# Patient Record
Sex: MALE | Race: WHITE
[De-identification: names, ages, dates, MRNs, and addresses within clinical notes are randomized per-mention and may not be internally consistent; named-entity substitution may affect disease eponyms.]

---

## 2018-06-10 ENCOUNTER — HOSPITAL ENCOUNTER (EMERGENCY)
Dept: HOSPITAL 80 - CED | Age: 63
Discharge: HOME | End: 2018-06-10
Payer: MEDICAID

## 2018-06-10 VITALS — DIASTOLIC BLOOD PRESSURE: 92 MMHG | SYSTOLIC BLOOD PRESSURE: 152 MMHG
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
## 2018-06-10 DIAGNOSIS — I25.2: ICD-10-CM

## 2018-06-10 DIAGNOSIS — Z79.82: ICD-10-CM

## 2018-06-10 DIAGNOSIS — T18.9XXA: Primary | ICD-10-CM

## 2018-06-10 DIAGNOSIS — I10: ICD-10-CM

## 2018-06-10 NOTE — EDPHY
H & P


Time Seen by Provider: 06/10/18 16:31


HPI/ROS: 





CHIEF COMPLAINT:  Pill stuck in throat





HISTORY OF PRESENT ILLNESS:  This is a 62-year-old gentleman who reports that 

this morning he took 2 Colace pills.  The 1st pill he swallowed normally.  He 

feels that the 2nd pill was stuck right at the level of his larynx.  Patient 

has been able to eat solids, and drink liquids without any difficulty.  He is 

handling his secretions.  He did not have a coughing or choking episode when he 

was swallowing the pills.  He does not feel short of breath.  He does not have 

any chest pain.  He reports it feels like the pill is "rolling around" near his 

larynx, to the left side.  He does report that he has difficulty swallowing his 

pills and will feel like 1 of them gets stuck at least every couple of months.  

He has not had a upper GI study performed.


The patient did not take his other usual medications this morning after having 

difficulty swallowing the Colace, does he feels confident that it was the 

Colace tablet which is lodged.


He was otherwise well with the exception of some constipation.





REVIEW OF SYSTEMS:


Aside from elements discussed in the HPI, a comprehensive 10-point review of 

systems was reviewed and is negative.





PAST MEDICAL HISTORY:  Hypertension.  Myocardial infarction, sinus surgeries.





SOCIAL HISTORY:  Nonsmoker.





VITAL SIGNS:  see nurse's notes.


GENERAL:  Well-developed, well-nourished, speaking in a normal voice.  No 

stridor.  No respiratory distress.  No drooling.  


HEENT:  Normal, no discharge or icterus, moist mucous membranes.  Posterior 

pharynx is normal, no abrasions noted.  No pooling of secretions.  Neck:  supple

, FROM.  No subcutaneous air palpated.  No tenderness along the larynx.


LUNGS:  Clear to auscultation bilaterally, no wheezes, rhonchi or rales.  No 

stridor.


CARDIAC:  Regular rate and rhythm, no rubs, murmurs or gallops.


ABDOMEN:  Soft, nontender, nondistended, bowel sounds normal.


BACK:  No CVA tenderness.  No vertebral tenderness.


EXTREMITIES:  No edema, FROM.


NEURO:  Alert and oriented, grossly nonfocal.


SKIN:  Warm and dry, no rash.


Smoking Status: Former smoker


Constitutional: 


 Initial Vital Signs











Temperature (C)  36.7 C   06/10/18 16:27


 


Heart Rate  81   06/10/18 16:27


 


Respiratory Rate  18   06/10/18 16:27


 


Blood Pressure  146/97 H  06/10/18 16:27


 


O2 Sat (%)  93   06/10/18 16:27








 











O2 Delivery Mode               Room Air














Allergies/Adverse Reactions: 


 





Sulfa (Sulfonamide Antibiotics) Adverse Reaction (Verified 06/10/18 16:23)


 








Home Medications: 














 Medication  Instructions  Recorded


 


Aspirin [Aspirin 325 mg (OTC)] 325 mg PO DAILY PRN 03/30/13


 


Lisinopril [Zestril 2.5 mg (*)] 2.5 mg PO DAILY #30 tab 03/30/13


 


Metoprolol Tartrate [Lopressor 50 50 mg PO BID #60 tab 03/30/13





mg (*)]  


 


Ondansetron Odt [Zofran Odt] 4 mg PO Q4PRN PRN #20 tab 03/30/13


 


Atorvastatin Calcium  11/13/14


 


Protonix  11/13/14


 


Gabapentin  04/12/15


 


Claritin  06/10/18


 


Nortriptyline HCl  06/10/18


 


Pulmicort 0.5MG/2Ml Neb  06/10/18


 


Ranitidine HCl  06/10/18


 


traZODone  06/10/18














Medical Decision Making





- Diagnostics


Imaging Results: 


 Imaging Impressions





Soft Tissue Neck X-Ray  06/10/18 16:43


Impression:


1. No radiopaque foreign object identified. Multiple pills may not be 

radiopaque.


2. Degenerative change in the cervical spine.


3. Carotid atherosclerosis.











ED Course/Re-evaluation: 





Soft tissue lateral of the neck was ordered to evaluate for any foreign body 

visible in the upper airway.


Patient does not have any stridor, difficulty speaking, coughing, or choking.


No foreign body visualized in the upper airway on the soft tissues.  We do not 

have a nasopharyngeal scope available at this institution.  Patient received a 

GI cocktail and felt like it diminished his discomfort although he occasionally 

still feels that there is something stuck to the left side of his larynx.  He 

continues to look well with no speech difficulty or stridor.


He was advised to follow up with ENT as soon as possible if he continues to 

have symptoms especially if he should have coughing or choking sensation.  He 

understands reasons to return to the emergency department.  She also 

understands that drinking fluids and carbonated beverages may be helpful in 

helping any residual pill fragments to dissolve.


Differential Diagnosis: 





Differential diagnoses for the patient's symptom complex was considered 

including but not limited to swallowed foreign body, esophageal abrasion, 

aspirated foreign body, foreign body sensation, esophagitis, pill esophagitis.





- Data Points


Medications Given: 


 








Discontinued Medications





Al Hydroxide/Mg Hydroxide (Maalox Susp)  30 ml PO ONCE ONE


   Stop: 06/10/18 16:44


   Last Admin: 06/10/18 17:02 Dose:  30 ml


Hyoscyamine Sulfate (Levsin, Hyomax-Sl)  0.25 mg PO ONCE ONE


   Stop: 06/10/18 16:44


   Last Admin: 06/10/18 17:02 Dose:  0.25 mg


Lidocaine (Lidocaine 2% Viscous)  15 ml PO ONCE ONE


   Stop: 06/10/18 16:44


   Last Admin: 06/10/18 17:02 Dose:  15 ml








Departure





- Departure


Disposition: Home, Routine, Self-Care


Clinical Impression: 


Swallowed foreign body


Qualifiers:


 Encounter type: initial encounter Qualified Code(s): T18.9XXA - Foreign body 

of alimentary tract, part unspecified, initial encounter





Condition: Good


Instructions:  Esophageal Foreign Body (ED)


Additional Instructions: 


Please drink plenty of liquids and fluids.  Consider drinking carbonated 

beverage which may help dissolve any residual pill fragments.





Okay to take Maalox or Mylanta this helps to the esophagus.





Avoid spicy foods until you are feeling better.





Follow up with ENT as directed below if you continued to feel that there is a 

pill lodged near your larynx.





I would also encourage you to follow up with Gastroenterology if you continued 

to have difficulty swallowing pills.  You may need to have a esophageal study 

performed.


Referrals: 


Deven Escobar MD [Primary Care Provider] - As per Instructions


Brian Hilton MD [Medical Doctor] - As per Instructions (Dr. Hilton is in 

Ear Nose and Throat physician)


Morgan Gonzalez MD, FACG [Medical Doctor] - As per Instructions (Dr. Gonzalez is a 

gastroenterologist)

## 2018-06-13 ENCOUNTER — HOSPITAL ENCOUNTER (OUTPATIENT)
Dept: HOSPITAL 80 - CIMAGING | Age: 63
End: 2018-06-13
Attending: PHYSICIAN ASSISTANT
Payer: MEDICAID

## 2018-06-13 DIAGNOSIS — R11.2: Primary | ICD-10-CM

## 2018-10-12 ENCOUNTER — HOSPITAL ENCOUNTER (EMERGENCY)
Dept: HOSPITAL 80 - CED | Age: 63
Discharge: HOME | End: 2018-10-12
Payer: COMMERCIAL

## 2018-10-12 VITALS — DIASTOLIC BLOOD PRESSURE: 86 MMHG | SYSTOLIC BLOOD PRESSURE: 139 MMHG

## 2018-10-12 DIAGNOSIS — R11.2: ICD-10-CM

## 2018-10-12 DIAGNOSIS — R10.10: Primary | ICD-10-CM

## 2018-10-12 PROCEDURE — 99285 EMERGENCY DEPT VISIT HI MDM: CPT

## 2018-10-12 PROCEDURE — 96361 HYDRATE IV INFUSION ADD-ON: CPT

## 2018-10-12 PROCEDURE — 74177 CT ABD & PELVIS W/CONTRAST: CPT

## 2018-10-12 PROCEDURE — 96374 THER/PROPH/DIAG INJ IV PUSH: CPT

## 2018-10-12 RX ADMIN — HYOSCYAMINE SULFATE ONE MG: 0.12 TABLET ORAL at 07:35

## 2018-10-12 RX ADMIN — SODIUM CHLORIDE ONE MLS: 900 INJECTION, SOLUTION INTRAVENOUS at 05:53

## 2018-10-12 RX ADMIN — HALOPERIDOL LACTATE ONE MG: 5 INJECTION, SOLUTION INTRAMUSCULAR at 05:52

## 2018-10-12 NOTE — EDPHY
H & P


Stated Complaint: Stomach cramps intermittant.


Source: Patient





- Medical/Surgical History


Other PMH: Previous heart attack, cyclic vomiting syndrome, uses marijuana, 

spinal fusion C5-6, degerative disks, cataracts '2010.  multiple sinus surgerie

, Gastric issues sees Gastroenerologist.





- Social History


Smoking Status: Former smoker


Time Seen by Provider: 10/12/18 05:22


HPI/ROS: 





CHIEF COMPLAINT:  Nausea vomiting abdominal pain





History by patient





HISTORY OF PRESENT ILLNESS:  63-year-old man with a history of prior MI and 

cyclic vomiting syndrome, and abdominal surgery for duodenal cyst presents 

tonight complaining of upper abdominal pain which woke him from sleep.  Patient 

states that earlier in the evening he had some nausea after eating pizza and 

then developed some diffuse crampy pain that radiated to his back but then woke 

up with more nausea had 1 episode of vomiting now has the persistent pain.  He 

states that is typical for him to have nausea every day and his last episode of 

severe vomiting with 6 months ago but that he normally does not get associated 

abdominal pain.  He denies any diarrhea.  He denies any fever.  Pain does not 

radiate to his leg or groin.  He denies any numbness or weakness in his legs.  

He denies any urinary symptoms..  He has chronic neck pain for which he 

sometimes takes Percocet but says he has not taken this in several months.  He 

smokes marijuana for his chronic neck pain.  He says his cyclic vomiting began 

before he started smoking marijuana.  He is followed by  of GI and 

Dr. Forte.





REVIEW OF SYSTEMS:


As in HPI, and all other systems reviewed and are negative (Ana Walker)





- Physical Exam


Exam: 





General Appearance:  Alert, nontoxic appearing, comfortable appearing.


Eyes:  Pupils equal and round, extraocular movements intact, no pallor or 

injection.


Mouth:  Mucous membranes moist. Pharynx clear


Respiratory:  Normal, effort, lungs are clear to auscultation. No wheezes, 

rales or rhonchi.


Cardiovascular:  Regular rate and rhythm. S1, S2, no murmurs, gallops or rubs 

appreciated


Gastrointestinal:  Well-healed midline scar, bowel sounds present, Abdomen is 

soft and nondistended, nontender, no masses


Back:  No CVA tenderness, no bony tenderness


:  Normal external genitalia


Neurological:  Awake, alert and oriented x 3, no pronator drift, normal gait, 

no pronator drift


Skin:  Warm and dry, no rashes.


Musculoskeletal:   No deformities or tenderness.


Extremities: full range of motion, no edema, femoral pulses equal bilaterally


Psychiatric:  Patient has normal affect, there is no agitation. (Ana Walker)


Constitutional: 





 Initial Vital Signs











Temperature (C)  37.1 C   10/12/18 05:10


 


Heart Rate  69   10/12/18 05:10


 


Respiratory Rate  16   10/12/18 05:10


 


Blood Pressure  161/89 H  10/12/18 05:10


 


O2 Sat (%)  91 L  10/12/18 05:10








 











O2 Delivery Mode               Room Air














Allergies/Adverse Reactions: 


 





Sulfa (Sulfonamide Antibiotics) Adverse Reaction (Intermediate, Verified 10/12/

18 05:11)


 Rash








Home Medications: 














 Medication  Instructions  Recorded


 


Aspirin [Aspirin 325 mg (OTC)] 325 mg PO DAILY PRN 03/30/13


 


Lisinopril [Zestril 2.5 mg (*)] 2.5 mg PO DAILY #30 tab 03/30/13


 


Metoprolol Tartrate [Lopressor 50 50 mg PO BID #60 tab 03/30/13





mg (*)]  


 


Ondansetron Odt [Zofran Odt] 4 mg PO Q4PRN PRN #20 tab 03/30/13


 


Atorvastatin Calcium  11/13/14


 


Protonix  11/13/14


 


Gabapentin  04/12/15


 


Claritin  06/10/18


 


Pulmicort 0.5MG/2Ml Neb  06/10/18


 


traZODone  06/10/18


 


Hyoscyamine Sulfate [Levsin, 0.125 - 0.25 mg SL Q6 PRN #20 tab 10/12/18





Hyomax-Sl 0.125 mg (*)]  














Medical Decision Making





- Diagnostics


EKG Interpretation: 





Normal sinus rhythm at a rate of 60 with normal axis, normal intervals, early 

transition of R-wave, there is no old EKG available for comparison.  No 

evidence of acute ischemia.  Impression:  Abnormal EKG (Ana Walker)


ED Course/Re-evaluation: 





63-year-old man with history of chronic cyclical vomiting syndrome presents 

with nausea, vomiting and abdominal pain which is not typical for him.  Exam is 

unremarkable and he is hemodynamically stable.  Patient was given IV Haldol 

with improvement in his pain and nausea but not complete resolution of his 

nausea.  I reviewed old records showed the patient had ultrasound 3 months ago 

that showed a borderline aneurysm neck proximal aorta and unremarkable 

gallbladder.  Given this and the atypical pain symptoms the CT scan is obtained 

his abdomen.  This was read by the radiologist as ileus versus early small 

bowel obstruction.  Patient's labs were notable for slightly low potassium, 

normal CBC and normal liver enzymes.  Lipase is pending at time dictation.





On re-evaluation of the patient was feeling somewhat better and was willing to 

try oral fluids.  There is no obvious evidence of emergent surgical condition 

at this time.  I will transfer care to Dr. Carmichael pending results of the 

lipase and p.o. Challenge. (Ana Walker)





I spoke with this patient at 7:30 a.m. Relating the his normal lipase to him 

and re-examined his belly at this time is normoactive bowels with mild 

tenderness in left upper quadrant with no guarding or rebound.  He reports that 

he has 2 to 3/10 crampy discomfort that his nausea is improved.  He tolerated 

p. O. Ginger ale.  He would like to try some Levsin for his cramping prior to 

discharge.   (Phong Carmichael)





- Data Points


Laboratory Results: 





 











  10/12/18 10/12/18 10/12/18





  06:11 06:09 05:58


 


POC Sodium      140 mEq/L mEq/L





     (135-145) 


 


POC Potassium      3.2 mEq/L L mEq/L





     (3.3-5.0) 


 


POC Chloride      103.0 mEq/L mEq/L





     () 


 


POC Total CO2      24 mEq/L mEq/L





     (22-31) 


 


POC BUN      14 mg/dL mg/dL





     (7-23) 


 


POC Creatinine      1.0 mg/dL mg/dL





     (0.7-1.3) 


 


POC Glucose      135 mg/dL H mg/dL





     () 


 


POC Calcium      9.8 mg/dL mg/dL





     (8.5-10.4) 


 


POC Total Bilirubin  0.9 mg/dL mg/dL    





   (0.1-1.4)   


 


POC GGT  20 IU/L IU/L    





   (5-65)   


 


POC AST  34 IU/L IU/L    





   (17-59)   


 


POC ALT  28 IU/L IU/L    





   (21-72)   


 


POC Alk Phosphatase  81 IU/L IU/L    





   ()   


 


POC Troponin I    0.01 ng/mL ng/mL  





    (0.00-0.08)  


 


POC Total Protein  7.2 g/dL g/dL    





   (6.3-8.2)   


 


POC Albumin  4.4 g/dL g/dL    





   (3.5-5.0)   


 


POC Amylase  50 IU/L IU/L    





   ()   


 


Lipase      





    














  10/12/18





  05:45


 


POC Sodium  





  


 


POC Potassium  





  


 


POC Chloride  





  


 


POC Total CO2  





  


 


POC BUN  





  


 


POC Creatinine  





  


 


POC Glucose  





  


 


POC Calcium  





  


 


POC Total Bilirubin  





  


 


POC GGT  





  


 


POC AST  





  


 


POC ALT  





  


 


POC Alk Phosphatase  





  


 


POC Troponin I  





  


 


POC Total Protein  





  


 


POC Albumin  





  


 


POC Amylase  





  


 


Lipase  86 IU/L IU/L





   () 











Medications Given: 





 








Discontinued Medications





Haloperidol Lactate (Haldol Injection)  2.5 mg IVP EDNOW ONE


   Stop: 10/12/18 05:41


   Last Admin: 10/12/18 05:52 Dose:  2.5 mg


Sodium Chloride (Ns)  1,000 mls @ 0 mls/hr IV EDNOW ONE; Wide Open


   PRN Reason: Protocol


   Stop: 10/12/18 05:28


   Last Admin: 10/12/18 05:53 Dose:  1,000 mls





Point of Care Test Results: 





 CBC











CBC Collection Date            10/12/18


 


CBC Collection Time            05:45


 


WBC                            8.4


 


RBC                            5.11


 


HGB                            16.6


 


HCT                            46.4


 


PLT                            185


 


Neut #                         5.7


 


Neut                           66.9


 


LYMPH #                        1.8


 


LYMPH                          21.8


 


Other WBC #                    0.9


 


Other WBC                      11.3


 


MCV                            90.8











 Chemistry











  10/12/18 10/12/18 10/12/18





  06:11 06:09 05:58


 


POC Sodium      140 mEq/L mEq/L





     (135-145) 


 


POC Potassium      3.2 mEq/L L mEq/L





     (3.3-5.0) 


 


POC Chloride      103.0 mEq/L mEq/L





     () 


 


POC Total CO2      24 mEq/L mEq/L





     (22-31) 


 


POC BUN      14 mg/dL mg/dL





     (7-23) 


 


POC Creatinine      1.0 mg/dL mg/dL





     (0.7-1.3) 


 


POC Glucose      135 mg/dL H mg/dL





     () 


 


POC Calcium      9.8 mg/dL mg/dL





     (8.5-10.4) 


 


POC Total Bilirubin  0.9 mg/dL mg/dL    





   (0.1-1.4)   


 


POC GGT  20 IU/L IU/L    





   (5-65)   


 


POC AST  34 IU/L IU/L    





   (17-59)   


 


POC ALT  28 IU/L IU/L    





   (21-72)   


 


POC Alk Phosphatase  81 IU/L IU/L    





   ()   


 


POC Troponin I    0.01 ng/mL ng/mL  





    (0.00-0.08)  


 


POC Total Protein  7.2 g/dL g/dL    





   (6.3-8.2)   


 


POC Albumin  4.4 g/dL g/dL    





   (3.5-5.0)   


 


POC Amylase  50 IU/L IU/L    





   ()   








 Liver Function Tests











LFT Collection Date            10/12/18


 


LFT Collection Time            05:45

















Departure





- Departure


Clinical Impression: 


Abdominal pain


Qualifiers:


 Abdominal location: upper abdomen, unspecified Qualified Code(s): R10.10 - 

Upper abdominal pain, unspecified





Nausea and vomiting


Qualifiers:


 Vomiting type: unspecified Vomiting Intractability: non-intractable Qualified 

Code(s): R11.2 - Nausea with vomiting, unspecified





Condition: Fair


Instructions:  Acute Nausea and Vomiting (ED), Acute Abdominal Pain (ED)


Additional Instructions: 


Diagnoses:  1. Abdominal pain 2. Vomiting





Plan:  Light diet until you feel improved





Zofran for nausea vomiting as needed





Levsin for abdominal discomfort if needed





Follow up with primary care physician for any ongoing symptoms





Return emergency department for any significant worsening despite treatment 

plan 


Referrals: 


Patient,NotPresent [Primary Care Provider] - As per Instructions


Mariano Mueller MD [Medical Doctor] - As per Instructions

## 2018-11-02 ENCOUNTER — HOSPITAL ENCOUNTER (OUTPATIENT)
Dept: HOSPITAL 80 - BHFA | Age: 63
End: 2018-11-02
Attending: INTERNAL MEDICINE
Payer: COMMERCIAL

## 2018-11-02 DIAGNOSIS — I25.10: Primary | ICD-10-CM
